# Patient Record
Sex: FEMALE | Race: WHITE | NOT HISPANIC OR LATINO | Employment: UNEMPLOYED | URBAN - METROPOLITAN AREA
[De-identification: names, ages, dates, MRNs, and addresses within clinical notes are randomized per-mention and may not be internally consistent; named-entity substitution may affect disease eponyms.]

---

## 2019-06-05 ENCOUNTER — OFFICE VISIT (OUTPATIENT)
Dept: OBSTETRICS AND GYNECOLOGY | Facility: CLINIC | Age: 39
End: 2019-06-05

## 2019-06-05 VITALS
BODY MASS INDEX: 20.02 KG/M2 | WEIGHT: 113 LBS | DIASTOLIC BLOOD PRESSURE: 70 MMHG | SYSTOLIC BLOOD PRESSURE: 100 MMHG | HEIGHT: 63 IN

## 2019-06-05 DIAGNOSIS — N76.0 ACUTE VAGINITIS: Primary | ICD-10-CM

## 2019-06-05 DIAGNOSIS — Z13.9 SCREENING FOR CONDITION: ICD-10-CM

## 2019-06-05 LAB
B-HCG UR QL: NEGATIVE
BILIRUB BLD-MCNC: NEGATIVE MG/DL
CLARITY, POC: CLEAR
COLOR UR: YELLOW
GLUCOSE UR STRIP-MCNC: NEGATIVE MG/DL
INTERNAL NEGATIVE CONTROL: NEGATIVE
INTERNAL POSITIVE CONTROL: POSITIVE
KETONES UR QL: NEGATIVE
LEUKOCYTE EST, POC: NEGATIVE
Lab: NORMAL
NITRITE UR-MCNC: NEGATIVE MG/ML
PH UR: 5 [PH] (ref 5–8)
PROT UR STRIP-MCNC: NEGATIVE MG/DL
RBC # UR STRIP: NEGATIVE /UL
SP GR UR: 1 (ref 1–1.03)
UROBILINOGEN UR QL: NORMAL

## 2019-06-05 PROCEDURE — 99213 OFFICE O/P EST LOW 20 MIN: CPT | Performed by: OBSTETRICS & GYNECOLOGY

## 2019-06-05 PROCEDURE — 81025 URINE PREGNANCY TEST: CPT | Performed by: OBSTETRICS & GYNECOLOGY

## 2019-06-05 RX ORDER — VALACYCLOVIR HYDROCHLORIDE 500 MG/1
500 TABLET, FILM COATED ORAL DAILY
Qty: 90 TABLET | Refills: 3 | Status: SHIPPED | OUTPATIENT
Start: 2019-06-05 | End: 2020-12-15

## 2019-06-05 RX ORDER — VALACYCLOVIR HYDROCHLORIDE 500 MG/1
500 TABLET, FILM COATED ORAL 2 TIMES DAILY
Qty: 6 TABLET | Refills: 6 | Status: SHIPPED | OUTPATIENT
Start: 2019-06-05 | End: 2019-06-08

## 2019-06-05 NOTE — PROGRESS NOTES
"      Jennie Curry is a 38 y.o. patient who presents for follow up of   Chief Complaint   Patient presents with   • Vaginal Discharge       HPI 38-year-old female complaining of acute vaginitis.  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  She recently had a second outbreak for which she took a refill of her Valtrex.  It was a 10-day prescription.  She feels fine on the medicine but as soon as she stops that she feels like things are just not right in the vagina.  She says her symptoms are intravaginal and not extra vaginal like on the vulva.  She has also taken 2 doses of Diflucan and is on probiotics orally.    The following portions of the patient's history were reviewed and updated as appropriate: allergies, current medications and problem list.    Review of Systems   Constitutional: Negative for appetite change, fever and unexpected weight change.   HENT: Negative for congestion and sore throat.    Respiratory: Negative for cough and shortness of breath.    Cardiovascular: Negative for chest pain and palpitations.   Gastrointestinal: Negative for abdominal distention, abdominal pain, constipation, diarrhea, nausea and vomiting.   Endocrine: Negative.    Genitourinary: Positive for genital sores, vaginal discharge and vaginal pain. Negative for dyspareunia, menstrual problem and pelvic pain.   Skin: Negative.    Neurological: Negative for dizziness and syncope.   Hematological: Negative.    Psychiatric/Behavioral: Negative for dysphoric mood and sleep disturbance. The patient is not nervous/anxious.        /70   Ht 160 cm (63\")   Wt 51.3 kg (113 lb)   LMP 05/27/2019   Breastfeeding? No   BMI 20.02 kg/m²     Physical Exam   Constitutional: She is oriented to person, place, and time. She appears well-developed and well-nourished.   HENT:   Head: Normocephalic and atraumatic.   Pulmonary/Chest: Effort normal. No respiratory distress.   Abdominal: Soft. She exhibits no distension and no " mass. There is no tenderness. There is no rebound and no guarding.   Genitourinary: There is no rash, tenderness or lesion on the right labia. There is no rash, tenderness or lesion on the left labia.   Musculoskeletal: Normal range of motion.   Neurological: She is alert and oriented to person, place, and time.   Skin: Skin is warm and dry.   Psychiatric: She has a normal mood and affect. Her behavior is normal. Judgment and thought content normal.   Nursing note and vitals reviewed.        Assessment/Plan    Jennie was seen today for vaginal discharge.    Diagnoses and all orders for this visit:    Acute vaginitis    Screening for condition  -     POC Urinalysis Dipstick  -     POC Pregnancy, Urine    Other orders  -     valACYclovir (VALTREX) 500 MG tablet; Take 1 tablet by mouth Daily for 90 days.  -     valACYclovir (VALTREX) 500 MG tablet; Take 1 tablet by mouth 2 (Two) Times a Day for 3 days.    Exam appears normal.  Recommend she stop probiotics.  We will start daily Valtrex HSV suppression and a new prescription for recurrent outbreaks was also sent in.  Treat accordingly.    Return if symptoms worsen or fail to improve.      J Carlos Resendiz MD  6/5/2019  4:04 PM

## 2019-06-08 LAB
A VAGINAE DNA VAG QL NAA+PROBE: NORMAL SCORE
BVAB2 DNA VAG QL NAA+PROBE: NORMAL SCORE
C ALBICANS DNA VAG QL NAA+PROBE: NEGATIVE
C GLABRATA DNA VAG QL NAA+PROBE: NEGATIVE
C TRACH RRNA SPEC QL NAA+PROBE: NEGATIVE
MEGA1 DNA VAG QL NAA+PROBE: NORMAL SCORE
N GONORRHOEA RRNA SPEC QL NAA+PROBE: NEGATIVE
T VAGINALIS RRNA SPEC QL NAA+PROBE: NEGATIVE

## 2020-12-15 RX ORDER — VALACYCLOVIR HYDROCHLORIDE 500 MG/1
TABLET, FILM COATED ORAL
Qty: 90 TABLET | Refills: 2 | Status: SHIPPED | OUTPATIENT
Start: 2020-12-15 | End: 2021-12-22

## 2021-12-22 RX ORDER — VALACYCLOVIR HYDROCHLORIDE 500 MG/1
TABLET, FILM COATED ORAL
Qty: 90 TABLET | Refills: 2 | Status: SHIPPED | OUTPATIENT
Start: 2021-12-22

## 2023-02-06 ENCOUNTER — OFFICE VISIT (OUTPATIENT)
Dept: OBSTETRICS AND GYNECOLOGY | Facility: CLINIC | Age: 43
End: 2023-02-06
Payer: COMMERCIAL

## 2023-02-06 VITALS
DIASTOLIC BLOOD PRESSURE: 82 MMHG | BODY MASS INDEX: 20.91 KG/M2 | HEIGHT: 63 IN | WEIGHT: 118 LBS | SYSTOLIC BLOOD PRESSURE: 118 MMHG

## 2023-02-06 DIAGNOSIS — Z01.419 ROUTINE GYNECOLOGICAL EXAMINATION: ICD-10-CM

## 2023-02-06 DIAGNOSIS — Z11.51 SPECIAL SCREENING EXAMINATION FOR HUMAN PAPILLOMAVIRUS (HPV): ICD-10-CM

## 2023-02-06 DIAGNOSIS — Z01.419 PAP SMEAR, LOW-RISK: Primary | ICD-10-CM

## 2023-02-06 LAB
BILIRUB BLD-MCNC: NEGATIVE MG/DL
CLARITY, POC: CLEAR
COLOR UR: YELLOW
GLUCOSE UR STRIP-MCNC: NEGATIVE MG/DL
KETONES UR QL: NEGATIVE
LEUKOCYTE EST, POC: NEGATIVE
NITRITE UR-MCNC: NEGATIVE MG/ML
PH UR: 5 [PH] (ref 5–8)
PROT UR STRIP-MCNC: NEGATIVE MG/DL
RBC # UR STRIP: NEGATIVE /UL
SP GR UR: 1 (ref 1–1.03)
UROBILINOGEN UR QL: NORMAL

## 2023-02-06 PROCEDURE — 81002 URINALYSIS NONAUTO W/O SCOPE: CPT | Performed by: NURSE PRACTITIONER

## 2023-02-06 PROCEDURE — 99386 PREV VISIT NEW AGE 40-64: CPT | Performed by: NURSE PRACTITIONER

## 2023-02-06 RX ORDER — PANTOPRAZOLE SODIUM 40 MG/1
40 TABLET, DELAYED RELEASE ORAL DAILY
COMMUNITY
Start: 2022-12-12

## 2023-02-06 RX ORDER — BROMPHENIRAMINE MALEATE, PSEUDOEPHEDRINE HYDROCHLORIDE, AND DEXTROMETHORPHAN HYDROBROMIDE 2; 30; 10 MG/5ML; MG/5ML; MG/5ML
SYRUP ORAL
COMMUNITY
Start: 2023-01-11

## 2023-02-06 RX ORDER — MESALAMINE 800 MG/1
1 TABLET, DELAYED RELEASE ORAL 3 TIMES DAILY
COMMUNITY
Start: 2022-12-07

## 2023-02-06 RX ORDER — AZITHROMYCIN 250 MG/1
TABLET, FILM COATED ORAL
COMMUNITY
Start: 2023-01-11

## 2023-02-06 RX ORDER — PREDNISONE 10 MG/1
TABLET ORAL
COMMUNITY
Start: 2022-12-29

## 2023-02-06 RX ORDER — MESALAMINE 1000 MG/1
SUPPOSITORY RECTAL
COMMUNITY
Start: 2022-12-12

## 2023-02-06 RX ORDER — ALUMINUM ZIRCONIUM OCTACHLOROHYDREX GLY 16 G/100G
1 GEL TOPICAL DAILY
COMMUNITY
Start: 2023-01-13

## 2023-02-10 ENCOUNTER — PATIENT ROUNDING (BHMG ONLY) (OUTPATIENT)
Dept: OBSTETRICS AND GYNECOLOGY | Facility: CLINIC | Age: 43
End: 2023-02-10
Payer: COMMERCIAL

## 2023-02-10 LAB
CYTOLOGIST CVX/VAG CYTO: NORMAL
CYTOLOGY CVX/VAG DOC CYTO: NORMAL
CYTOLOGY CVX/VAG DOC THIN PREP: NORMAL
DX ICD CODE: NORMAL
HIV 1 & 2 AB SER-IMP: NORMAL
HPV I/H RISK 4 DNA CVX QL PROBE+SIG AMP: NEGATIVE
OTHER STN SPEC: NORMAL
STAT OF ADQ CVX/VAG CYTO-IMP: NORMAL

## 2023-02-10 NOTE — PROGRESS NOTES
February 10, 2023    Hello, may I speak with Jennie Curry?    My name is jazmine rojas      I am  with KATHARINE GONG  [facility]  [address]  [place] [address] [phone].    Before we get started may I verify your date of birth? 1980    I am calling to officially welcome you to our practice and ask about your recent visit. Is this a good time to talk? no

## 2024-04-23 ENCOUNTER — OFFICE VISIT (OUTPATIENT)
Dept: ORTHOPEDIC SURGERY | Facility: CLINIC | Age: 44
End: 2024-04-23
Payer: COMMERCIAL

## 2024-04-23 VITALS
HEART RATE: 70 BPM | DIASTOLIC BLOOD PRESSURE: 74 MMHG | BODY MASS INDEX: 21.26 KG/M2 | HEIGHT: 63 IN | WEIGHT: 120 LBS | SYSTOLIC BLOOD PRESSURE: 120 MMHG

## 2024-04-23 DIAGNOSIS — M18.11 PRIMARY OSTEOARTHRITIS OF FIRST CARPOMETACARPAL JOINT OF RIGHT HAND: ICD-10-CM

## 2024-04-23 DIAGNOSIS — M79.641 RIGHT HAND PAIN: Primary | ICD-10-CM

## 2024-04-23 PROCEDURE — 73130 X-RAY EXAM OF HAND: CPT | Performed by: INTERNAL MEDICINE

## 2024-04-23 PROCEDURE — 20600 DRAIN/INJ JOINT/BURSA W/O US: CPT | Performed by: INTERNAL MEDICINE

## 2024-04-23 PROCEDURE — 99203 OFFICE O/P NEW LOW 30 MIN: CPT | Performed by: INTERNAL MEDICINE

## 2024-04-23 RX ORDER — FLUOXETINE HYDROCHLORIDE 20 MG/1
CAPSULE ORAL
COMMUNITY
Start: 2024-02-01

## 2024-04-23 RX ORDER — QUETIAPINE FUMARATE 25 MG/1
TABLET, FILM COATED ORAL
COMMUNITY
Start: 2024-02-01

## 2024-04-23 RX ORDER — TRIAMCINOLONE ACETONIDE 40 MG/ML
80 INJECTION, SUSPENSION INTRA-ARTICULAR; INTRAMUSCULAR
Status: COMPLETED | OUTPATIENT
Start: 2024-04-23 | End: 2024-04-23

## 2024-04-23 RX ORDER — LIDOCAINE HYDROCHLORIDE 10 MG/ML
1 INJECTION, SOLUTION EPIDURAL; INFILTRATION; INTRACAUDAL; PERINEURAL
Status: COMPLETED | OUTPATIENT
Start: 2024-04-23 | End: 2024-04-23

## 2024-04-23 RX ADMIN — TRIAMCINOLONE ACETONIDE 80 MG: 40 INJECTION, SUSPENSION INTRA-ARTICULAR; INTRAMUSCULAR at 13:30

## 2024-04-23 RX ADMIN — LIDOCAINE HYDROCHLORIDE 1 ML: 10 INJECTION, SOLUTION EPIDURAL; INFILTRATION; INTRACAUDAL; PERINEURAL at 13:30

## 2024-04-23 NOTE — PROGRESS NOTES
Subjective:     Patient ID: Jennie Curry is a 43 y.o. female.    Chief Complaint:    History of Present Illness  Jennie Curry presents to clinic today for evaluation of base of the right thumb pain.  She states that has been ongoing for about a year.  She states it is worse with activity and worse with gripping.  She states she has never had a specific injury.  She had an injection in her thumb years ago and it helped significantly.  She is hopeful to get some resolution of her symptoms and avoid surgery.  The patient is right-hand dominant.  She states she thinks that it is a repetitive use type injury from work.     Social History     Occupational History    Not on file   Tobacco Use    Smoking status: Former     Current packs/day: 0.00     Average packs/day: 0.5 packs/day for 20.0 years (10.0 ttl pk-yrs)     Types: Cigarettes     Start date: 2001     Quit date: 2021     Years since quittin.4     Passive exposure: Past    Smokeless tobacco: Never    Tobacco comments:     3 years smoke free   Vaping Use    Vaping status: Never Used   Substance and Sexual Activity    Alcohol use: Not Currently    Drug use: Not Currently    Sexual activity: Yes     Partners: Male     Birth control/protection: Tubal ligation     Comment: Tubal      Past Medical History:   Diagnosis Date    Anxiety     Arthritis of back 2017    Moderate    Arthritis of neck 2016    Moderate problems    CTS (carpal tunnel syndrome) 2016    Moderate problems    Herpes 2019    Ulcerative colitis      Past Surgical History:   Procedure Laterality Date     SECTION  2013     SECTION WITH TUBAL  2013    TUBAL ABDOMINAL LIGATION  2013    UMBILICAL HERNIA REPAIR  2015    WISDOM TOOTH EXTRACTION  2005       Family History   Problem Relation Age of Onset    Ovarian cancer Maternal Aunt     Breast cancer Neg Hx     Uterine cancer Neg Hx     Colon cancer Neg Hx                  Objective:  Vitals:    24 1334   BP:  "120/74   Pulse: 70   Weight: 54.4 kg (120 lb)   Height: 160 cm (63\")         04/23/24  1334   Weight: 54.4 kg (120 lb)     Body mass index is 21.26 kg/m².        Right Hand Exam     Tenderness   Right hand tenderness location: 1st cmc.    Range of Motion   Wrist   Extension:  normal   Flexion:  normal   Pronation:  normal   Supination:  normal     Muscle Strength   Wrist extension: 5/5   Wrist flexion: 5/5   : 5/5     Other   Erythema: absent  Scars: absent  Sensation: normal  Pulse: present    Comments:  Positive for CMC grind               Imaging: 3 views of the right hand were ordered and reviewed by myself in the office today  Indication: Right hand pain   Findings: x-rays demonstrate mild degenerative changes at the base of first CMC.  No other acute obvious abnormality  Comparative studies: None    Assessment:        1. Right hand pain    2. Primary osteoarthritis of first carpometacarpal joint of right hand           Plan:      Small Joint Arthrocentesis: R thumb CMC  Consent given by: patient  Site marked: site marked  Timeout: Immediately prior to procedure a time out was called to verify the correct patient, procedure, equipment, support staff and site/side marked as required   Supporting Documentation  Indications: pain   Procedure Details  Location: thumb - R thumb CMC  Preparation: Patient was prepped and draped in the usual sterile fashion  Needle size: 22 G  Approach: lateral  Medications administered: 1 mL lidocaine PF 1% 1 %; 80 mg triamcinolone acetonide 40 MG/ML  Patient tolerance: patient tolerated the procedure well with no immediate complications          Discussed treatment options at length with patient at today's visit.  I discussed with the patient that I believe she has developed first CMC mild osteoarthritis as a result of repetitive use. I discussed with the patient that the mainstays of conservative treatment for that include physical therapy, nonsteroidal anti-inflammatories, " injections, activity modification, and home exercises.  The patient states that they  would like to try first CMC corticosteroid injection and an over-the-counter first CMC brace.  At this point time the patient does not need to schedule follow-up with me today.  I am happy to see the patient back at any point time however if issues arise or they fail to make a full recovery.  Follow up: As needed with      Jennie Curry was in agreement with plan and had all questions answered.     Medications:  No orders of the defined types were placed in this encounter.      Followup:  No follow-ups on file.    Diagnoses and all orders for this visit:    1. Right hand pain (Primary)  -     XR Hand 3+ View Right    2. Primary osteoarthritis of first carpometacarpal joint of right hand    Other orders  -     Cancel: Small Joint Arthrocentesis  -     Cancel: Medium Joint Arthrocentesis  -     Small Joint Arthrocentesis: R thumb CMC          Dictated utilizing Dragon dictation

## 2024-04-26 ENCOUNTER — PATIENT ROUNDING (BHMG ONLY) (OUTPATIENT)
Dept: ORTHOPEDIC SURGERY | Facility: CLINIC | Age: 44
End: 2024-04-26
Payer: COMMERCIAL

## 2024-04-26 NOTE — PROGRESS NOTES
A card has been sent to the patient for PATIENT ROUNDING with [facility].

## 2024-11-14 NOTE — PROGRESS NOTES
Chief Complaint   Patient presents with    Ulcerative Colitis        Patient is a 44 y.o. who presents to the office as a new patient for management of ulcerative colitis after previously being followed by gastroenterologist in [facility].  Last outpatient visit with GI completed in 2024.  Patient has a significant past medical history of ulcerative proctosigmoiditis- diagnosed via colonoscopy on 2021 at outside health system.    Current UC regimen consist of Asacol 800 mg 1 tablet 3 times daily, Canasa 1000 mg suppository daily    Previous EGD and Colonoscopy evaluation: 2021 at time of diagnosis; pathology available however exam notes unavailable at time of today's visit    Past Surgical History:    Umbilical hernia repair    Social History:  Former tobacco user quit   Denies use of e-cigarettes  Denies current alcohol use    Family history:  Ovarian cancer in maternal aunt    History of Present Illness  The patient is a 44-year-old female presenting for evaluation of ulcerative colitis.    Ulcerative Colitis and Related Symptoms  She was initially diagnosed with proctitis following a colonoscopy in , although pathology was non-specific.  After endoscopic evaluation she completed stool testing for fecal calprotectin which were noted to be greater than 6000.      Monitoring gastroenterologist felt that the significant elevation in fecal calprotectin combined with endoscopic findings was consistent with ulcerative proctosigmoiditis, prompting the initiation of treatment.     She is currently prescribed Asacol (mesalamine) at a dosage of one tablet three times daily and Canasa (mesalamine) suppositories. The patient acknowledges inconsistent adherence to her medication regimen due to issues with prescription refills and shipment delays. She has been taking Asacol more regularly, at least one or two tablets daily.     Previously, she was on a prednisone taper starting at 40 mg,  decreasing by 10 mg per week, but found it ineffective. She has not trialed budesonide.    The patient reports episodes of constipation lasting 5 to 6 days, with a notable flare-up in August 2024, during which she experienced an absence of bowel movements for three days, followed by frequent bowel movements on the fourth day. She describes urgency during these episodes but denies nocturnal bowel movements.     In October 2024, she observed hematochezia, which has since improved. Despite dietary modifications, including a low lactose diet and avoidance of hydrogen sulfate-containing foods, she has not noted significant improvement.     She experiences excessive flatulence postprandially, leading to discomfort and pain, particularly during her 12-hour work shifts. She reports increased gastric acid production but does not use any acid-reducing medications. Postprandial pain localized to the lower left quadrant persists. Her previous physician recommended a gynecological evaluation for this pain.    Details of most recent flare described as:    - Onset: flare-up in August 2024; hematochezia observed in October 2024.    - Location: Lower left quadrant for postprandial pain.    - Duration: Episodes of constipation lasting 5 to 6 days; absence of bowel movements for three days followed by frequent bowel movements on the fourth day.    - Character: Constipation, urgency, hematochezia, excessive flatulence, increased gastric acid production, postprandial pain.    - Alleviating/Aggravating Factors: Dietary modifications (low lactose diet, avoidance of hydrogen sulfate-containing foods); inconsistent medication adherence due to prescription refill and shipment delays.    - Timing: Postprandial flatulence and pain, particularly during 12-hour work shifts.    - Severity: Discomfort and pain due to flatulence; persistent postprandial pain.    The patient denies unintentional weight loss, heartburn, nausea, vomiting, or  "dysphagia.    Anxiety Treatment  She was initiated on anxiety treatment approximately one year ago, and for several months thereafter, she reported significant improvement in her overall condition.     Result Review :    FECAL CALPROTECTIN (03/12/2024 16:23) - 27   FECAL CALPROTECTIN (03/18/2022 16:33)  6490   Tissue Pathology Exam (06/21/2021 07:00)   Transverse colon sampling:  Focal active colitis with mild melanosis coli  Rectal biopsy  Path: Acute colitis cryptitis, focal crypt abscess with retained architecture    reviewed and updated with patient  Outpatient Medications Marked as Taking for the 11/15/24 encounter (Office Visit) with Cheryl Luis APRN   Medication Sig Dispense Refill    FLUoxetine (PROzac) 20 MG capsule       Probiotic Product (Align) capsule Take 1 capsule by mouth Daily.      QUEtiapine (SEROquel) 25 MG tablet       valACYclovir (VALTREX) 500 MG tablet TAKE ONE TABLET BY MOUTH DAILY 90 tablet 2    [DISCONTINUED] mesalamine (ASACOL) 800 MG EC tablet Take 1 tablet by mouth 3 (Three) Times a Day.      [DISCONTINUED] mesalamine (CANASA) 1000 MG suppository INSERT ONE SUPPOSITORY RECTALLY EVERY NIGHT       Vital Signs:   /70 (BP Location: Left arm, Patient Position: Sitting, Cuff Size: Large Adult)   Ht 160 cm (62.99\")   Wt 55.8 kg (123 lb)   BMI 21.79 kg/m²     Body mass index is 21.79 kg/m².     Physical Exam  Vitals reviewed.   Constitutional:       General: She is not in acute distress.     Appearance: Normal appearance. She is not ill-appearing.   Eyes:      General: No scleral icterus.  Pulmonary:      Effort: Pulmonary effort is normal. No respiratory distress.   Abdominal:      General: Bowel sounds are normal. There is no distension.      Palpations: Abdomen is soft. Abdomen is not rigid. There is no mass or pulsatile mass.      Tenderness: There is abdominal tenderness. There is no guarding or rebound.      Hernia: No hernia is present.      Comments: Left lower quadrant " abdominal tenderness to deep palpation   Skin:     Coloration: Skin is not jaundiced.   Neurological:      Mental Status: She is alert and oriented to person, place, and time.   Psychiatric:         Thought Content: Thought content normal.         Judgment: Judgment normal.       Assessment and Plan    Diagnoses and all orders for this visit:    1. Ulcerative proctitis without complication (Primary)  -     CBC & Differential  -     Comprehensive Metabolic Panel  -     Vitamin B12  -     Vitamin D,25-Hydroxy  -     Folate  -     mesalamine (LIALDA) 1.2 g EC tablet; Take 3 tablets by mouth Daily for 90 days.  Dispense: 270 tablet; Refill: 0  -     Budesonide (ENTOCORT EC) 3 MG 24 hr capsule; Take 3 capsules by mouth Daily.  Dispense: 90 capsule; Refill: 2  -     Calprotectin, Fecal - Stool, Per Rectum  -     Gastrointestinal Panel, PCR - Stool, Per Rectum  -     Clostridioides difficile Toxin, PCR - Stool, Per Rectum  -     mesalamine (Canasa) 1000 MG suppository; Insert 1 suppository into the rectum 2 (Two) Times a Day.  Dispense: 60 suppository; Refill: 2       Assessment & Plan  1. Ulcerative colitis   Patient's condition is being managed with mesalamine, and fecal calprotectin level was 27 in 03/2024, indicating effective management.  Prescribe Lialda, 3 tablets to be taken in the morning in place of the Asacol in hopes of improving patient compliance and thus improving disease control.  Discontinue Asacol once Lialda prescription is filled  Prescribe Canasa suppositories for twice-daily use for 14 days, then reduce to once nightly  We will also start patient on budesonide 9 mg daily  Blood work to be completed today to assess CBC, CMP, vitamin B12, vitamin D, folate as patient reports generalized fatigue  Conduct stool test to assess current fecal calprotectin level; provide sample kit for return at convenience   Patient to provide an update in a month or sooner if condition does not improve; consider endoscopic  examination if condition worsens  We did also discuss proceeding with Coh[facility] IBD diagnostic testing to confirm UC diagnosis.  At this time patient would like to assess financial coverage by insurance before proceeding.  She will contact our office in the future if she would like to proceed.    To consider proceeding with colonoscopy evaluation pending above workup and clinical course.    Follow-up  - Return in 3 months for follow-up    Patient is agreeable to the outlined above treatment plan.  Verbalizes understanding and will contact office for any new or worsening concerns.  All questions answered and support provided.    Patient Instructions   Blood work today     Once we receive these results, our office will contact you to discuss updating your treatment plan as indicated      Stool Testing for Diarrhea:    We will check stool studies to assess for any infectious etiology that could be contributing to your symptoms.   Stool Specimens kit will be given today by the lab  If you cannot return stool specimens to lab within 24 hours, place specimen in provided bag with kit and keep in refrigerator until you are able to drop off specimen  Our office will contact you once we receive these results to discuss updating treatment plan as indicated   3.  I put in the orders for this, and you can go to the outpatient lab at any [facility] [place] facility to  the stool kit for this. [facility], [facility], or our office building are all OK. Any  time Monday-Friday from 8-4 is OK for the      For Ulcerative proctitis:  Continue Asacol as prescribed until you receive new prescription for lialda to take 3 tablets every AM   Also begin inserting canasa suppositories twice a day for 14 days, then can reduce to nightly  Begin taking budesonide 9 mg (3 capsules ) daily  Can be taken all at one time in the morning with food x 30 days         EMR Dragon/Transcription Disclaimer:  This document has been Dictated  utilizing Dragon dictation.     Patient or patient representative verbalized consent for the use of Ambient Listening during the visit with  GUADALUPE Aquino for chart documentation. 11/15/2024  09:56 EST    I spent more than 50 percent ( 31 minutes) of a  minute visit discussing diagnostic results, treatment options, and treatment plan.

## 2024-11-15 ENCOUNTER — OFFICE VISIT (OUTPATIENT)
Dept: GASTROENTEROLOGY | Facility: CLINIC | Age: 44
End: 2024-11-15
Payer: COMMERCIAL

## 2024-11-15 ENCOUNTER — LAB (OUTPATIENT)
Dept: LAB | Facility: HOSPITAL | Age: 44
End: 2024-11-15
Payer: COMMERCIAL

## 2024-11-15 VITALS
DIASTOLIC BLOOD PRESSURE: 70 MMHG | SYSTOLIC BLOOD PRESSURE: 110 MMHG | HEIGHT: 63 IN | WEIGHT: 123 LBS | BODY MASS INDEX: 21.79 KG/M2

## 2024-11-15 DIAGNOSIS — K51.20 ULCERATIVE PROCTITIS WITHOUT COMPLICATION: Primary | ICD-10-CM

## 2024-11-15 LAB
25(OH)D3 SERPL-MCNC: 33.7 NG/ML (ref 30–100)
ADV 40+41 DNA STL QL NAA+NON-PROBE: NOT DETECTED
ALBUMIN SERPL-MCNC: 3.8 G/DL (ref 3.5–5.2)
ALBUMIN/GLOB SERPL: 1.2 G/DL
ALP SERPL-CCNC: 83 U/L (ref 39–117)
ALT SERPL W P-5'-P-CCNC: 12 U/L (ref 1–33)
ANION GAP SERPL CALCULATED.3IONS-SCNC: 8.8 MMOL/L (ref 5–15)
AST SERPL-CCNC: 18 U/L (ref 1–32)
ASTRO TYP 1-8 RNA STL QL NAA+NON-PROBE: NOT DETECTED
BASOPHILS # BLD AUTO: 0.06 10*3/MM3 (ref 0–0.2)
BASOPHILS NFR BLD AUTO: 1 % (ref 0–1.5)
BILIRUB SERPL-MCNC: 0.3 MG/DL (ref 0–1.2)
BUN SERPL-MCNC: 11 MG/DL (ref 6–20)
BUN/CREAT SERPL: 14.5 (ref 7–25)
C CAYETANENSIS DNA STL QL NAA+NON-PROBE: NOT DETECTED
C COLI+JEJ+UPSA DNA STL QL NAA+NON-PROBE: NOT DETECTED
C DIFF TOX GENS STL QL NAA+PROBE: NEGATIVE
CALCIUM SPEC-SCNC: 9 MG/DL (ref 8.6–10.5)
CHLORIDE SERPL-SCNC: 105 MMOL/L (ref 98–107)
CO2 SERPL-SCNC: 24.2 MMOL/L (ref 22–29)
CREAT SERPL-MCNC: 0.76 MG/DL (ref 0.57–1)
CRYPTOSP DNA STL QL NAA+NON-PROBE: NOT DETECTED
DEPRECATED RDW RBC AUTO: 39.8 FL (ref 37–54)
E HISTOLYT DNA STL QL NAA+NON-PROBE: NOT DETECTED
EAEC PAA PLAS AGGR+AATA ST NAA+NON-PRB: NOT DETECTED
EC STX1+STX2 GENES STL QL NAA+NON-PROBE: NOT DETECTED
EGFRCR SERPLBLD CKD-EPI 2021: 100.5 ML/MIN/1.73
EOSINOPHIL # BLD AUTO: 0.21 10*3/MM3 (ref 0–0.4)
EOSINOPHIL NFR BLD AUTO: 3.4 % (ref 0.3–6.2)
EPEC EAE GENE STL QL NAA+NON-PROBE: NOT DETECTED
ERYTHROCYTE [DISTWIDTH] IN BLOOD BY AUTOMATED COUNT: 12.4 % (ref 12.3–15.4)
ETEC LTA+ST1A+ST1B TOX ST NAA+NON-PROBE: NOT DETECTED
FOLATE SERPL-MCNC: 7.95 NG/ML (ref 4.78–24.2)
G LAMBLIA DNA STL QL NAA+NON-PROBE: NOT DETECTED
GLOBULIN UR ELPH-MCNC: 3.3 GM/DL
GLUCOSE SERPL-MCNC: 85 MG/DL (ref 65–99)
HCT VFR BLD AUTO: 37.2 % (ref 34–46.6)
HGB BLD-MCNC: 11.8 G/DL (ref 12–15.9)
IMM GRANULOCYTES # BLD AUTO: 0.03 10*3/MM3 (ref 0–0.05)
IMM GRANULOCYTES NFR BLD AUTO: 0.5 % (ref 0–0.5)
LYMPHOCYTES # BLD AUTO: 2.03 10*3/MM3 (ref 0.7–3.1)
LYMPHOCYTES NFR BLD AUTO: 32.8 % (ref 19.6–45.3)
MCH RBC QN AUTO: 28 PG (ref 26.6–33)
MCHC RBC AUTO-ENTMCNC: 31.7 G/DL (ref 31.5–35.7)
MCV RBC AUTO: 88.2 FL (ref 79–97)
MONOCYTES # BLD AUTO: 0.51 10*3/MM3 (ref 0.1–0.9)
MONOCYTES NFR BLD AUTO: 8.2 % (ref 5–12)
NEUTROPHILS NFR BLD AUTO: 3.35 10*3/MM3 (ref 1.7–7)
NEUTROPHILS NFR BLD AUTO: 54.1 % (ref 42.7–76)
NOROVIRUS GI+II RNA STL QL NAA+NON-PROBE: NOT DETECTED
NRBC BLD AUTO-RTO: 0 /100 WBC (ref 0–0.2)
P SHIGELLOIDES DNA STL QL NAA+NON-PROBE: NOT DETECTED
PLATELET # BLD AUTO: 346 10*3/MM3 (ref 140–450)
PMV BLD AUTO: 10.7 FL (ref 6–12)
POTASSIUM SERPL-SCNC: 4.3 MMOL/L (ref 3.5–5.2)
PROT SERPL-MCNC: 7.1 G/DL (ref 6–8.5)
RBC # BLD AUTO: 4.22 10*6/MM3 (ref 3.77–5.28)
RVA RNA STL QL NAA+NON-PROBE: NOT DETECTED
S ENT+BONG DNA STL QL NAA+NON-PROBE: NOT DETECTED
SAPO I+II+IV+V RNA STL QL NAA+NON-PROBE: NOT DETECTED
SHIGELLA SP+EIEC IPAH ST NAA+NON-PROBE: NOT DETECTED
SODIUM SERPL-SCNC: 138 MMOL/L (ref 136–145)
V CHOL+PARA+VUL DNA STL QL NAA+NON-PROBE: NOT DETECTED
V CHOLERAE DNA STL QL NAA+NON-PROBE: NOT DETECTED
VIT B12 BLD-MCNC: 1000 PG/ML (ref 211–946)
WBC NRBC COR # BLD AUTO: 6.19 10*3/MM3 (ref 3.4–10.8)
Y ENTEROCOL DNA STL QL NAA+NON-PROBE: NOT DETECTED

## 2024-11-15 PROCEDURE — 87493 C DIFF AMPLIFIED PROBE: CPT

## 2024-11-15 PROCEDURE — 82306 VITAMIN D 25 HYDROXY: CPT

## 2024-11-15 PROCEDURE — 80053 COMPREHEN METABOLIC PANEL: CPT

## 2024-11-15 PROCEDURE — 87507 IADNA-DNA/RNA PROBE TQ 12-25: CPT

## 2024-11-15 PROCEDURE — 83993 ASSAY FOR CALPROTECTIN FECAL: CPT

## 2024-11-15 PROCEDURE — 82746 ASSAY OF FOLIC ACID SERUM: CPT

## 2024-11-15 PROCEDURE — 36415 COLL VENOUS BLD VENIPUNCTURE: CPT

## 2024-11-15 PROCEDURE — 82607 VITAMIN B-12: CPT

## 2024-11-15 PROCEDURE — 85025 COMPLETE CBC W/AUTO DIFF WBC: CPT

## 2024-11-15 RX ORDER — MESALAMINE 1.2 G/1
3.6 TABLET, DELAYED RELEASE ORAL DAILY
Qty: 270 TABLET | Refills: 0 | Status: SHIPPED | OUTPATIENT
Start: 2024-11-15 | End: 2025-02-13

## 2024-11-15 RX ORDER — BUDESONIDE 3 MG/1
9 CAPSULE, COATED PELLETS ORAL DAILY
Qty: 90 CAPSULE | Refills: 2 | Status: SHIPPED | OUTPATIENT
Start: 2024-11-15

## 2024-11-15 RX ORDER — MESALAMINE 1000 MG/1
1000 SUPPOSITORY RECTAL 2 TIMES DAILY
Qty: 60 SUPPOSITORY | Refills: 2 | Status: SHIPPED | OUTPATIENT
Start: 2024-11-15 | End: 2024-11-18 | Stop reason: SDUPTHER

## 2024-11-15 NOTE — PATIENT INSTRUCTIONS
Blood work today     Once we receive these results, our office will contact you to discuss updating your treatment plan as indicated      Stool Testing for Diarrhea:    We will check stool studies to assess for any infectious etiology that could be contributing to your symptoms.   Stool Specimens kit will be given today by the lab  If you cannot return stool specimens to lab within 24 hours, place specimen in provided bag with kit and keep in refrigerator until you are able to drop off specimen  Our office will contact you once we receive these results to discuss updating treatment plan as indicated   3.  I put in the orders for this, and you can go to the outpatient lab at any [facility] facility to  the stool kit for this. [facility], [facility], or our office building are all OK. Any  time Monday-Friday from 8-4 is OK for the      For Ulcerative proctitis:  Continue Asacol as prescribed until you receive new prescription for lialda to take 3 tablets every AM   Also begin inserting canasa suppositories twice a day for 14 days, then can reduce to nightly  Begin taking budesonide 9 mg (3 capsules ) daily  Can be taken all at one time in the morning with food x 30 days

## 2024-11-18 ENCOUNTER — TELEPHONE (OUTPATIENT)
Dept: GASTROENTEROLOGY | Facility: CLINIC | Age: 44
End: 2024-11-18
Payer: COMMERCIAL

## 2024-11-19 LAB — CALPROTECTIN STL-MCNT: 811 UG/G (ref 0–120)

## 2024-11-20 ENCOUNTER — PATIENT MESSAGE (OUTPATIENT)
Dept: GASTROENTEROLOGY | Facility: CLINIC | Age: 44
End: 2024-11-20
Payer: COMMERCIAL

## 2024-11-20 DIAGNOSIS — K51.20 ULCERATIVE PROCTITIS WITHOUT COMPLICATION: Primary | ICD-10-CM

## 2024-11-21 ENCOUNTER — PREP FOR SURGERY (OUTPATIENT)
Dept: SURGERY | Facility: SURGERY CENTER | Age: 44
End: 2024-11-21
Payer: COMMERCIAL

## 2024-11-21 DIAGNOSIS — R10.84 GENERALIZED ABDOMINAL PAIN: ICD-10-CM

## 2024-11-21 DIAGNOSIS — K51.20 ULCERATIVE PROCTITIS WITHOUT COMPLICATION: Primary | ICD-10-CM

## 2024-11-21 DIAGNOSIS — R19.5 ELEVATED FECAL CALPROTECTIN: ICD-10-CM

## 2024-11-21 RX ORDER — SODIUM CHLORIDE 0.9 % (FLUSH) 0.9 %
3 SYRINGE (ML) INJECTION EVERY 12 HOURS SCHEDULED
OUTPATIENT
Start: 2024-11-21

## 2024-11-21 RX ORDER — DICYCLOMINE HYDROCHLORIDE 10 MG/1
10 CAPSULE ORAL 3 TIMES DAILY PRN
Qty: 90 CAPSULE | Refills: 5 | Status: SHIPPED | OUTPATIENT
Start: 2024-11-21

## 2024-11-21 RX ORDER — SODIUM CHLORIDE 0.9 % (FLUSH) 0.9 %
10 SYRINGE (ML) INJECTION AS NEEDED
OUTPATIENT
Start: 2024-11-21

## 2024-11-21 RX ORDER — SODIUM CHLORIDE, SODIUM LACTATE, POTASSIUM CHLORIDE, CALCIUM CHLORIDE 600; 310; 30; 20 MG/100ML; MG/100ML; MG/100ML; MG/100ML
30 INJECTION, SOLUTION INTRAVENOUS CONTINUOUS PRN
OUTPATIENT
Start: 2024-11-21 | End: 2024-11-22

## 2024-11-21 RX ORDER — METHYLPREDNISOLONE 4 MG/1
TABLET ORAL
Qty: 21 TABLET | Refills: 0 | Status: SHIPPED | OUTPATIENT
Start: 2024-11-21

## 2024-12-18 ENCOUNTER — ANESTHESIA EVENT (OUTPATIENT)
Dept: PERIOP | Facility: HOSPITAL | Age: 44
End: 2024-12-18
Payer: COMMERCIAL

## 2024-12-19 ENCOUNTER — ANESTHESIA (OUTPATIENT)
Dept: PERIOP | Facility: HOSPITAL | Age: 44
End: 2024-12-19
Payer: COMMERCIAL

## 2024-12-19 ENCOUNTER — HOSPITAL ENCOUNTER (OUTPATIENT)
Facility: HOSPITAL | Age: 44
Setting detail: HOSPITAL OUTPATIENT SURGERY
Discharge: HOME | End: 2024-12-19
Attending: STUDENT IN AN ORGANIZED HEALTH CARE EDUCATION/TRAINING PROGRAM | Admitting: STUDENT IN AN ORGANIZED HEALTH CARE EDUCATION/TRAINING PROGRAM
Payer: COMMERCIAL

## 2024-12-19 VITALS
OXYGEN SATURATION: 100 % | TEMPERATURE: 97.7 F | BODY MASS INDEX: 22.15 KG/M2 | WEIGHT: 125 LBS | HEART RATE: 56 BPM | RESPIRATION RATE: 17 BRPM | HEIGHT: 63 IN | SYSTOLIC BLOOD PRESSURE: 112 MMHG | DIASTOLIC BLOOD PRESSURE: 82 MMHG

## 2024-12-19 DIAGNOSIS — K51.20 ULCERATIVE PROCTITIS WITHOUT COMPLICATION: ICD-10-CM

## 2024-12-19 DIAGNOSIS — R10.84 GENERALIZED ABDOMINAL PAIN: ICD-10-CM

## 2024-12-19 DIAGNOSIS — R19.5 ELEVATED FECAL CALPROTECTIN: ICD-10-CM

## 2024-12-19 PROCEDURE — 45378 DIAGNOSTIC COLONOSCOPY: CPT | Performed by: STUDENT IN AN ORGANIZED HEALTH CARE EDUCATION/TRAINING PROGRAM

## 2024-12-19 PROCEDURE — 25010000002 PROPOFOL 200 MG/20ML EMULSION

## 2024-12-19 PROCEDURE — 25010000002 LIDOCAINE PF 1% 1 % SOLUTION

## 2024-12-19 RX ORDER — SODIUM CHLORIDE 0.9 % (FLUSH) 0.9 %
10 SYRINGE (ML) INJECTION AS NEEDED
Status: DISCONTINUED | OUTPATIENT
Start: 2024-12-19 | End: 2024-12-19 | Stop reason: HOSPADM

## 2024-12-19 RX ORDER — SODIUM CHLORIDE 9 MG/ML
INJECTION, SOLUTION INTRAMUSCULAR; INTRAVENOUS; SUBCUTANEOUS AS NEEDED
Status: DISCONTINUED | OUTPATIENT
Start: 2024-12-19 | End: 2024-12-19 | Stop reason: SURG

## 2024-12-19 RX ORDER — PROPOFOL 10 MG/ML
INJECTION, EMULSION INTRAVENOUS AS NEEDED
Status: DISCONTINUED | OUTPATIENT
Start: 2024-12-19 | End: 2024-12-19 | Stop reason: SURG

## 2024-12-19 RX ORDER — SODIUM CHLORIDE 0.9 % (FLUSH) 0.9 %
3 SYRINGE (ML) INJECTION EVERY 12 HOURS SCHEDULED
Status: DISCONTINUED | OUTPATIENT
Start: 2024-12-19 | End: 2024-12-19 | Stop reason: HOSPADM

## 2024-12-19 RX ORDER — ONDANSETRON 2 MG/ML
4 INJECTION INTRAMUSCULAR; INTRAVENOUS ONCE AS NEEDED
Status: DISCONTINUED | OUTPATIENT
Start: 2024-12-19 | End: 2024-12-19 | Stop reason: HOSPADM

## 2024-12-19 RX ORDER — SODIUM CHLORIDE, SODIUM LACTATE, POTASSIUM CHLORIDE, CALCIUM CHLORIDE 600; 310; 30; 20 MG/100ML; MG/100ML; MG/100ML; MG/100ML
30 INJECTION, SOLUTION INTRAVENOUS CONTINUOUS PRN
Status: DISCONTINUED | OUTPATIENT
Start: 2024-12-19 | End: 2024-12-19 | Stop reason: HOSPADM

## 2024-12-19 RX ORDER — SODIUM CHLORIDE, SODIUM LACTATE, POTASSIUM CHLORIDE, CALCIUM CHLORIDE 600; 310; 30; 20 MG/100ML; MG/100ML; MG/100ML; MG/100ML
100 INJECTION, SOLUTION INTRAVENOUS ONCE
Status: DISCONTINUED | OUTPATIENT
Start: 2024-12-19 | End: 2024-12-19 | Stop reason: HOSPADM

## 2024-12-19 RX ORDER — LIDOCAINE HYDROCHLORIDE 10 MG/ML
0.5 INJECTION, SOLUTION EPIDURAL; INFILTRATION; INTRACAUDAL; PERINEURAL ONCE AS NEEDED
Status: DISCONTINUED | OUTPATIENT
Start: 2024-12-19 | End: 2024-12-19 | Stop reason: HOSPADM

## 2024-12-19 RX ORDER — SODIUM CHLORIDE 0.9 % (FLUSH) 0.9 %
10 SYRINGE (ML) INJECTION EVERY 12 HOURS SCHEDULED
Status: DISCONTINUED | OUTPATIENT
Start: 2024-12-19 | End: 2024-12-19 | Stop reason: HOSPADM

## 2024-12-19 RX ORDER — LIDOCAINE HYDROCHLORIDE 10 MG/ML
INJECTION, SOLUTION EPIDURAL; INFILTRATION; INTRACAUDAL; PERINEURAL AS NEEDED
Status: DISCONTINUED | OUTPATIENT
Start: 2024-12-19 | End: 2024-12-19 | Stop reason: SURG

## 2024-12-19 RX ADMIN — PROPOFOL 250 MG: 10 INJECTION, EMULSION INTRAVENOUS at 08:23

## 2024-12-19 RX ADMIN — SODIUM CHLORIDE 3 ML: 9 INJECTION, SOLUTION INTRAMUSCULAR; INTRAVENOUS; SUBCUTANEOUS at 08:38

## 2024-12-19 RX ADMIN — LIDOCAINE HYDROCHLORIDE 30 MG: 10 INJECTION, SOLUTION EPIDURAL; INFILTRATION; INTRACAUDAL; PERINEURAL at 08:20

## 2024-12-19 NOTE — ANESTHESIA POSTPROCEDURE EVALUATION
Patient: Jennie Curry    Procedure Summary       Date: 12/19/24 Room / Location: [facility] ENDOSCOPY 2 /  [facility]    Anesthesia Start: 0816 Anesthesia Stop: 0840    Procedure: COLONOSCOPY FOR SCREENING Diagnosis:       Ulcerative proctitis without complication      Elevated fecal calprotectin      Generalized abdominal pain      (Ulcerative proctitis without complication [K51.20])      (Elevated fecal calprotectin [R19.5])      (Generalized abdominal pain [R10.84])    Surgeons: Lorenzo Ozuna MD Provider: Mary Ching CRNA    Anesthesia Type: MAC ASA Status: 1            Anesthesia Type: MAC    Vitals  Vitals Value Taken Time   /76 12/19/24 0910   Temp 97.7 °F (36.5 °C) 12/19/24 0841   Pulse 56 12/19/24 0920   Resp     SpO2 100 % 12/19/24 0920   Vitals shown include unfiled device data.        Post Anesthesia Care and Evaluation    Patient location during evaluation: bedside  Patient participation: complete - patient participated  Level of consciousness: awake and alert  Pain score: 0  Pain management: adequate    Airway patency: patent  Anesthetic complications: No anesthetic complications  PONV Status: none  Cardiovascular status: acceptable  Respiratory status: acceptable  Hydration status: acceptable

## 2024-12-19 NOTE — ANESTHESIA PREPROCEDURE EVALUATION
Anesthesia Evaluation     Patient summary reviewed and Nursing notes reviewed   NPO Solid Status: > 8 hours  NPO Liquid Status: > 8 hours           Airway   Mallampati: I  TM distance: >3 FB  Neck ROM: full  No difficulty expected  Dental - normal exam     Pulmonary - normal exam    breath sounds clear to auscultation  (+) a smoker Former,  Cardiovascular - negative cardio ROS and normal exam  Exercise tolerance: good (4-7 METS)    Rhythm: regular  Rate: normal        Neuro/Psych  (+) numbness (right thumb), psychiatric history Anxiety  GI/Hepatic/Renal/Endo    (+) hiatal hernia (repaired in 2013)    Musculoskeletal     Abdominal    Substance History      OB/GYN          Other   arthritis (right thumb), autoimmune disease (UC) ,                       Anesthesia Plan    ASA 1     MAC     intravenous induction     Anesthetic plan, risks, benefits, and alternatives have been provided, discussed and informed consent has been obtained with: patient.  Pre-procedure education provided  Use of blood products discussed with patient  Consented to blood products.    Plan discussed with CRNA.        CODE STATUS:

## 2024-12-19 NOTE — H&P
Patient Care Team:  Catherine Brown PCP - General (Nurse Practitioner)    CHIEF COMPLAINT:  C/s for surveillance of Ulcerative Proctosigmoiditis.     HISTORY OF PRESENT ILLNESS:  C/s for surveillance of Ulcerative Proctosigmoiditis.     Past Medical History:   Diagnosis Date    Anxiety     Arthritis of back 2017    Moderate    Arthritis of neck 2016    Moderate problems    CTS (carpal tunnel syndrome) 2016    Moderate problems    Herpes 2019    Ulcerative colitis      Past Surgical History:   Procedure Laterality Date     SECTION       SECTION WITH TUBAL  2013    TUBAL ABDOMINAL LIGATION      UMBILICAL HERNIA REPAIR  2015    WISDOM TOOTH EXTRACTION  2005     Family History   Problem Relation Age of Onset    Ovarian cancer Maternal Aunt     Breast cancer Neg Hx     Uterine cancer Neg Hx     Colon cancer Neg Hx      Social History     Tobacco Use    Smoking status: Former     Current packs/day: 0.00     Average packs/day: 0.5 packs/day for 20.0 years (10.0 ttl pk-yrs)     Types: Cigarettes     Start date: 2001     Quit date: 2021     Years since quitting: 3.1     Passive exposure: Past    Smokeless tobacco: Never    Tobacco comments:     3 years smoke free   Vaping Use    Vaping status: Never Used   Substance Use Topics    Alcohol use: Not Currently    Drug use: Not Currently     Medications Prior to Admission   Medication Sig Dispense Refill Last Dose/Taking    dicyclomine (BENTYL) 10 MG capsule Take 1 capsule by mouth 3 (Three) Times a Day As Needed (abdominal pain/diarrhea). 90 capsule 5 2024 Bedtime    FLUoxetine (PROzac) 20 MG capsule    2024    mesalamine (Canasa) 1000 MG suppository Insert 1 suppository into the rectum Every Night. 90 suppository 3 2024 Bedtime    mesalamine (LIALDA) 1.2 g EC tablet Take 3 tablets by mouth Daily for 90 days. 270 tablet 0 2024 Bedtime    Probiotic Product (Align) capsule Take 1 capsule by mouth Daily.   2024  "   QUEtiapine (SEROquel) 25 MG tablet    12/18/2024 Bedtime    Budesonide (ENTOCORT EC) 3 MG 24 hr capsule Take 3 capsules by mouth Daily. 90 capsule 2      Allergies:  Penicillins    REVIEW OF SYSTEMS:  Please see the above history of present illness for pertinent positives and negatives.  The remainder of the patient's systems have been reviewed and are negative.     Vital Signs  Temp:  [98 °F (36.7 °C)] 98 °F (36.7 °C)  Heart Rate:  [64] 64  Resp:  [17] 17  BP: (117)/(76) 117/76    Flowsheet Rows      Flowsheet Row First Filed Value   Admission Height 160 cm (63\") Documented at 12/18/2024 1317   Admission Weight 56.7 kg (125 lb) Documented at 12/18/2024 1317             Physical Exam:  Physical Exam   Constitutional: Patient appears well-developed and well-nourished and in no acute distress   HEENT:   Head: Normocephalic and atraumatic.   Eyes:  Pupils are equal, round, and reactive to light. EOM are intact. Sclerae are anicteric and non-injected.  Mouth and Throat: Patient has moist mucous membranes. Oropharynx is clear of any erythema or exudate.     Neck: Neck supple. No JVD present. No thyromegaly present. No lymphadenopathy present.  Cardiovascular: Regular rate, regular rhythm, S1 normal and S2 normal.  Exam reveals no gallop and no friction rub.  No murmur heard.  Pulmonary/Chest: Lungs are clear to auscultation bilaterally. No respiratory distress. No wheezes. No rhonchi. No rales.   Abdominal: Soft. Bowel sounds are normal. No distension and no mass. There is no hepatosplenomegaly. There is no tenderness.   Musculoskeletal: Normal Muscle tone  Extremities: No edema. Pulses are palpable in all 4 extremities.  Neurological: Patient is alert and oriented to person, place, and time. Cranial nerves II-XII are grossly intact with no focal deficits.  Skin: Skin is warm. No rash noted. Nails show no clubbing.  No cyanosis or erythema.    Debilities/Disabilities Identified: None  Emotional Behavior: " Appropriate     Results Review:   I reviewed the patient's new clinical results.    Lab Results (most recent)       None            Imaging Results (Most Recent)       None          reviewed    ECG/EMG Results (most recent)       None          reviewed    Assessment & Plan   C/s for surveillance of Ulcerative Proctosigmoiditis.  /  colonoscopy      I discussed the patient's findings and my recommendations with patient.     Lorenzo Ozuna MD  12/19/24  08:22 EST    Time: 10 min prior to procedure.

## 2024-12-23 ENCOUNTER — OFFICE VISIT (OUTPATIENT)
Age: 44
End: 2024-12-23
Payer: COMMERCIAL

## 2024-12-23 DIAGNOSIS — F40.10 SOCIAL ANXIETY DISORDER: Primary | ICD-10-CM

## 2024-12-23 PROCEDURE — 90792 PSYCH DIAG EVAL W/MED SRVCS: CPT

## 2024-12-23 RX ORDER — QUETIAPINE FUMARATE 25 MG/1
25 TABLET, FILM COATED ORAL NIGHTLY
Qty: 30 TABLET | Refills: 0 | Status: SHIPPED | OUTPATIENT
Start: 2024-12-23

## 2024-12-23 RX ORDER — FLUOXETINE 40 MG/1
40 CAPSULE ORAL DAILY
Qty: 30 CAPSULE | Refills: 1 | Status: SHIPPED | OUTPATIENT
Start: 2024-12-23 | End: 2025-02-21
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
## 2024-12-27 ENCOUNTER — TELEPHONE (OUTPATIENT)
Age: 44
End: 2024-12-27
Payer: COMMERCIAL

## 2024-12-27 ENCOUNTER — PATIENT ROUNDING (BHMG ONLY) (OUTPATIENT)
Age: 44
End: 2024-12-27
Payer: COMMERCIAL

## 2024-12-27 NOTE — TELEPHONE ENCOUNTER
Pt sent a message through my chart stating that she was afraid to come off her Seroquel as she has had difficulties coming off of it in the past in regards to sleep.  Discussed with patient that the plan is to continue her current dose of Seroquel until her anxiety is under better control with the increased dose of Prozac.  Discussed with patient that we will talk about taking her off Seroquel in the future.

## 2025-01-17 ENCOUNTER — TRANSCRIBE ORDERS (OUTPATIENT)
Dept: ADMINISTRATIVE | Facility: HOSPITAL | Age: 45
End: 2025-01-17
Payer: COMMERCIAL

## 2025-01-17 DIAGNOSIS — Z12.31 BREAST CANCER SCREENING BY MAMMOGRAM: Primary | ICD-10-CM

## 2025-01-20 ENCOUNTER — OFFICE VISIT (OUTPATIENT)
Age: 45
End: 2025-01-20
Payer: COMMERCIAL

## 2025-01-20 VITALS
HEIGHT: 63 IN | HEART RATE: 80 BPM | WEIGHT: 120 LBS | BODY MASS INDEX: 21.26 KG/M2 | DIASTOLIC BLOOD PRESSURE: 83 MMHG | SYSTOLIC BLOOD PRESSURE: 124 MMHG | OXYGEN SATURATION: 96 %

## 2025-01-20 VITALS — HEART RATE: 70 BPM | OXYGEN SATURATION: 99 % | SYSTOLIC BLOOD PRESSURE: 135 MMHG | DIASTOLIC BLOOD PRESSURE: 86 MMHG

## 2025-01-20 DIAGNOSIS — F40.10 SOCIAL ANXIETY DISORDER: Primary | ICD-10-CM

## 2025-01-20 PROCEDURE — 96127 BRIEF EMOTIONAL/BEHAV ASSMT: CPT

## 2025-01-20 PROCEDURE — 99214 OFFICE O/P EST MOD 30 MIN: CPT

## 2025-01-20 RX ORDER — HYDROXYZINE HYDROCHLORIDE 25 MG/1
25 TABLET, FILM COATED ORAL 3 TIMES DAILY PRN
Qty: 90 TABLET | Refills: 0 | Status: SHIPPED | OUTPATIENT
Start: 2025-01-20

## 2025-01-20 RX ORDER — VENLAFAXINE HYDROCHLORIDE 37.5 MG/1
37.5 CAPSULE, EXTENDED RELEASE ORAL DAILY
Qty: 30 CAPSULE | Refills: 1 | Status: SHIPPED | OUTPATIENT
Start: 2025-01-20 | End: 2025-03-21

## 2025-01-20 NOTE — PROGRESS NOTES
"     Office  Follow Up Visit      Patient Name: Jennie Curry  : 1980   MRN: [id number]     Referring Provider: Catherine Brown    Chief Complaint:  \"anxiety\"     ICD-10-CM ICD-9-CM   1. Social anxiety disorder  F40.10 300.23      History of Present Illness:   Jennie Curry is a 44 y.o. female who is here today for follow up. Pt has a history of ASHIA with panic attacks and social anxiety DO. Pt was seen for an initial evaluation on 24. Pt came to provider taking Prozac 20 mg daily and Seroquel 25 mg nightly. Pt felt that this medication kept her anxiety manageable but did not like the daytime sedation she would experience with seroquel but does not feel like her anxiety is managed with prozac 20mg daily alone. During initial evaluation pt's prozac was increased to 40mg daily and seroquel 25mg nightly was continue for the time being.     Today pt reports that she only took the prozac 40mg daily for a few days but then remembered that she experienced sexual side effects when her prozac was increased last time. Pt's anxiety and depressive symptoms remain about the same as last visit. She feels anxious and depressed at times but these feelings are usually manageable. Admits that she did stop taking her seroquel and that she has seen a slight increase in her anxiety and her sleep routine is a little different without the medication. States she wakes up every 4 hours now but still gets about 5 hours of sleep each night. Denies SI/HI/AVH. No change in appetite.     Subjective      Review of Systems:   Review of Systems   Constitutional:  Negative for appetite change.   Respiratory:  Negative for chest tightness and shortness of breath.    Gastrointestinal:  Negative for abdominal pain, constipation, diarrhea, nausea and vomiting.   Genitourinary:  Negative for difficulty urinating.   Neurological:  Negative for headaches.   Psychiatric/Behavioral:  Negative for hallucinations and suicidal ideas. The patient " is nervous/anxious.        PHQ-9 Depression Screening  Little interest or pleasure in doing things? Several days   Feeling down, depressed, or hopeless? Several days   PHQ-2 Total Score 2   Trouble falling or staying asleep, or sleeping too much? Almost all   Feeling tired or having little energy? Over half   Poor appetite or overeating? Almost all   Feeling bad about yourself - or that you are a failure or have let yourself or your family down? Not at all   Trouble concentrating on things, such as reading the newspaper or watching television? Over half   Moving or speaking so slowly that other people could have noticed? Or the opposite - being so fidgety or restless that you have been moving around a lot more than usual? Not at all   Thoughts that you would be better off dead, or of hurting yourself in some way? Not at all   PHQ-9 Total Score 12   If you checked off any problems, how difficult have these problems made it for you to do your work, take care of things at home, or get along with other people? Somewhat difficult     ASHIA-7      Over the last two weeks, how often have you been bothered by the following problems?  Feeling nervous, anxious or on edge: Several days  Not being able to stop or control worrying: More than half the days  Worrying too much about different things: Nearly every day  Trouble Relaxing: More than half the days  Being so restless that it is hard to sit still: Not at all  Becoming easily annoyed or irritable: Several days  Feeling afraid as if something awful might happen: Not at all  ASHIA 7 Total Score: 9  If you checked any problems, how difficult have these problems made it for you to do your work, take care of things at home, or get along with other people: Somewhat difficult    Patient History:   The following portions of the patient's history were reviewed and updated as appropriate: allergies, current medications, past family history, past medical history, past social history,  past surgical history and problem list.     Social History     Socioeconomic History    Marital status: Single   Tobacco Use    Smoking status: Former     Current packs/day: 0.00     Average packs/day: 0.5 packs/day for 20.0 years (10.0 ttl pk-yrs)     Types: Cigarettes     Start date: 11/9/2001     Quit date: 11/9/2021     Years since quitting: 3.2     Passive exposure: Past    Smokeless tobacco: Never    Tobacco comments:     3 years smoke free   Vaping Use    Vaping status: Never Used   Substance and Sexual Activity    Alcohol use: Not Currently    Drug use: Not Currently    Sexual activity: Yes     Partners: Male     Birth control/protection: Tubal ligation     Comment: Tubal       Medications:     Current Outpatient Medications:     Budesonide (ENTOCORT EC) 3 MG 24 hr capsule, Take 3 capsules by mouth Daily., Disp: 90 capsule, Rfl: 2    dicyclomine (BENTYL) 10 MG capsule, Take 1 capsule by mouth 3 (Three) Times a Day As Needed (abdominal pain/diarrhea)., Disp: 90 capsule, Rfl: 5    FLUoxetine (PROzac) 40 MG capsule, Take 1 capsule by mouth Daily for 60 days., Disp: 30 capsule, Rfl: 1    hydrOXYzine (ATARAX) 25 MG tablet, Take 1 tablet by mouth 3 (Three) Times a Day As Needed for Anxiety., Disp: 90 tablet, Rfl: 0    mesalamine (Canasa) 1000 MG suppository, Insert 1 suppository into the rectum Every Night., Disp: 90 suppository, Rfl: 3    mesalamine (LIALDA) 1.2 g EC tablet, Take 3 tablets by mouth Daily for 90 days., Disp: 270 tablet, Rfl: 0    Probiotic Product (Align) capsule, Take 1 capsule by mouth Daily., Disp: , Rfl:     QUEtiapine (SEROquel) 25 MG tablet, Take 1 tablet by mouth Every Night., Disp: 30 tablet, Rfl: 0    venlafaxine XR (Effexor XR) 37.5 MG 24 hr capsule, Take 1 capsule by mouth Daily for 60 days. Day 1-3: Take Prozac 20mg daily and Effexor XR 37.5mg daily. Day 4 : Stop Prozac and continue Effexor 37.5mg daily until next appointment., Disp: 30 capsule, Rfl: 1    Objective     Physical  "Exam:  Vital Signs:   Vitals:    01/20/25 0815   BP: 135/86   Pulse: 70   SpO2: 99%     There is no height or weight on file to calculate BMI.     Mental Status Exam:   MENTAL STATUS EXAM   General Appearance:  Cleanly groomed and dressed  Eye Contact:  Good eye contact  Attitude:  Cooperative  Motor Activity:  Normal gait, posture  Muscle Strength:  Normal  Speech:  Normal rate, tone, volume  Language:  Spontaneous  Mood and affect:  Anxious  Hopelessness:  Denies  Loneliness: Denies  Thought Process:  Logical  Associations/ Thought Content:  No delusions  Hallucinations:  None  Suicidal Ideations:  Not present  Homicidal Ideation:  Not present  Sensorium:  Alert and clear  Orientation:  Person, place, time and situation  Attention Span/ Concentration:  Good  Fund of Knowledge:  Appropriate for age and educational level  Intellectual Functioning:  Average range  Insight:  Good  Judgement:  Good  Reliability:  Good  Impulse Control:  Good       @RESULASTCBCDIFFPANEL,TSH,LABLIPI,VKZZOAXD29,MYHHZDWT41,MG,FOLATE,PROLACTIN,CRPRESULT,CMP,L3AZCWOTBDE)@    Lab Results   Component Value Date    GLUCOSE 85 11/15/2024    BUN 11 11/15/2024    CREATININE 0.76 11/15/2024    EGFR 100.5 11/15/2024    BCR 14.5 11/15/2024    K 4.3 11/15/2024    CO2 24.2 11/15/2024    CALCIUM 9.0 11/15/2024    ALBUMIN 3.8 11/15/2024    BILITOT 0.3 11/15/2024    AST 18 11/15/2024    ALT 12 11/15/2024       Lab Results   Component Value Date    WBC 6.19 11/15/2024    HGB 11.8 (L) 11/15/2024    HCT 37.2 11/15/2024    MCV 88.2 11/15/2024     11/15/2024       No results found for: \"CHOL\", \"CHLPL\", \"TRIG\", \"HDL\", \"LDL\"             Assessment / Plan      Assessment:  Pt is a 45 yo female with a history of ASHIA with panic attacks and social anxiety DO. Pt was seen for an initial evaluation on 12/23/24. Pt came to provider taking Prozac 20 mg daily and Seroquel 25 mg nightly. Pt felt that this medication kept her anxiety manageable but did not like the " daytime sedation she would experience with seroquel but does not feel like her anxiety is managed with prozac 20mg daily alone. During initial evaluation pt's prozac was increased to 40mg daily and seroquel 25mg nightly was continue for the time being. Today pt reports that she only took the prozac 40mg daily for a few days but then remembered that she experienced sexual side effects when her prozac was increased last time. Admits that she stopped taking her seroquel like 7 days ago and has notice an increase in her anxiety and some sleep disturbance. Pt's PHQ9 and GAD7 scores are higher than last visit. Pt has had a good response to Effexor XR in the past, so we will cross taper pt from prozac to Effexor XR. Taper schedule below. Pt starts therapy in clinic soon. Will see pt in 4 weeks.     Taper schedule: Day 1-3: Prozac 20mg daily and Effexor XR 37.5mg daily. Day 4 : Stop Prozac and continue Effexor 37.5mg daily until next appointment.       Diagnoses and all orders for this visit:    1. Social anxiety disorder (Primary)    Other orders  -     venlafaxine XR (Effexor XR) 37.5 MG 24 hr capsule; Take 1 capsule by mouth Daily for 60 days. Day 1-3: Take Prozac 20mg daily and Effexor XR 37.5mg daily. Day 4 : Stop Prozac and continue Effexor 37.5mg daily until next appointment.  Dispense: 30 capsule; Refill: 1  -     hydrOXYzine (ATARAX) 25 MG tablet; Take 1 tablet by mouth 3 (Three) Times a Day As Needed for Anxiety.  Dispense: 90 tablet; Refill: 0       Plan/Pt education:   - Discussed medication options and treatment plan of prescribed medication as well as the risks, benefits, and side effects   - Encouraged pt to continue supportive psychotherapy efforts and medications as indicated.  - Educated pt on signs and symptoms of serotonin syndrome/neuroleptic malignant syndrome and notified pt to go to the ER if experiencing these symptoms.   - Notified pt that antidepressants can sometimes cause worsening SI and to  monitor for this.     Short-term goals: Continue to develop rapport with patient.     Long-term goals: Symptom reduction with medication and therapy.     Weakness: Currently not in therapy.     Strengths: Great support from .    Continue supportive psychotherapy efforts and medications as indicated. Treatment and medication options discussed during today's visit. Patient ackowledged and verbally consented to continue with current treatment plan and was educated on the importance of compliance with treatment and follow-up appointments. Patient seems reasonably able to adhere to treatment plan.      Medication Considerations:  Discussed medication options and treatment plan of prescribed medication(s) as well as the risks, benefits, and potential side effects. Patient is agreeable to call the office with any worsening of symptoms or onset of side effects. Patient is agreeable to call 911 or go to the nearest ER should he/she begin having SI/HI.    Quality Measures:   Patient does not use tobacco products.     Dorian reviewed and is appropriate.    Follow Up:   Return in about 4 weeks (around 2/17/2025) for Recheck.    Copied text in this note has been reviewed and is accurate as of 1/20/2025.    Part of this note may be an electronic transcription/translation of spoken language to printed text using the Dragon Dictation System.    GUADALUPE Cedillo, PMHNP-BC

## 2025-01-28 ENCOUNTER — TELEPHONE (OUTPATIENT)
Dept: ORTHOPEDIC SURGERY | Facility: CLINIC | Age: 45
End: 2025-01-28
Payer: COMMERCIAL

## 2025-01-28 NOTE — TELEPHONE ENCOUNTER
CALLED PATIENT, LEFT VOICEMAIL,   PATIENT SENT A MESSAGE VIA [employer],   With Provider: RIANNA SHARIF [[facility] [place] [facility]]  Preferred Date Range: 1/27/2025 - 2/1/2025  Preferred Times: Any Time  Reason for Visit: Follow-up  Comments: Worsening pain in left thumb. Need appointment for cortisone injection or other relief.     IF PATIENT CALLS BACK, ANYONE CAN SCHEDULE THIS APPOINTMENT.

## 2025-02-05 ENCOUNTER — TELEPHONE (OUTPATIENT)
Age: 45
End: 2025-02-05

## 2025-02-05 NOTE — TELEPHONE ENCOUNTER
Patient called to let provider know that the hydroxyzine is putting her to sleep and she cannot function. She even tried taking a half dose. She also stated that she feels like the her other medications are not working and she is not sure if it is because of the anxiety medication. She would like a return call.

## 2025-02-06 RX ORDER — VENLAFAXINE HYDROCHLORIDE 75 MG/1
75 CAPSULE, EXTENDED RELEASE ORAL DAILY
Qty: 30 CAPSULE | Refills: 1 | Status: SHIPPED | OUTPATIENT
Start: 2025-02-06 | End: 2025-04-07

## 2025-02-06 NOTE — TELEPHONE ENCOUNTER
Called and spoke with pt regarding the message that was sent to me yesterday.  Will increase patient's Effexor XR to 75 mg daily to improve patient's anxiety.  Discussed with patient that hydroxyzine is sedating and to use it whenever she feels her anxiety is heightened.  Discussed with patient that it may not be a medication that you would want to use during your workday because it is so sedating.  Hopefully patient will see improvement with increasing her Effexor.  Patient denies SI/HI.

## 2025-02-13 DIAGNOSIS — K51.20 ULCERATIVE PROCTITIS WITHOUT COMPLICATION: ICD-10-CM

## 2025-02-14 RX ORDER — MESALAMINE 1.2 G/1
3.6 TABLET, DELAYED RELEASE ORAL DAILY
Qty: 270 TABLET | Refills: 0 | Status: SHIPPED | OUTPATIENT
Start: 2025-02-14

## 2025-03-21 ENCOUNTER — HOSPITAL ENCOUNTER (OUTPATIENT)
Dept: MAMMOGRAPHY | Facility: HOSPITAL | Age: 45
Discharge: HOME | End: 2025-03-21
Admitting: NURSE PRACTITIONER
Payer: COMMERCIAL

## 2025-03-21 DIAGNOSIS — Z12.31 BREAST CANCER SCREENING BY MAMMOGRAM: ICD-10-CM

## 2025-03-21 PROCEDURE — 77063 BREAST TOMOSYNTHESIS BI: CPT | Performed by: RADIOLOGY

## 2025-03-21 PROCEDURE — 77063 BREAST TOMOSYNTHESIS BI: CPT

## 2025-03-21 PROCEDURE — 77067 SCR MAMMO BI INCL CAD: CPT | Performed by: RADIOLOGY

## 2025-03-21 PROCEDURE — 77067 SCR MAMMO BI INCL CAD: CPT

## 2025-04-15 DIAGNOSIS — K51.20 ULCERATIVE PROCTITIS WITHOUT COMPLICATION: ICD-10-CM

## 2025-04-15 RX ORDER — BUDESONIDE 3 MG/1
9 CAPSULE, COATED PELLETS ORAL DAILY
Qty: 90 CAPSULE | Refills: 2 | OUTPATIENT
Start: 2025-04-15

## 2025-04-24 ENCOUNTER — OFFICE VISIT (OUTPATIENT)
Dept: GASTROENTEROLOGY | Facility: CLINIC | Age: 45
End: 2025-04-24
Payer: COMMERCIAL

## 2025-04-24 VITALS
SYSTOLIC BLOOD PRESSURE: 108 MMHG | WEIGHT: 123.2 LBS | DIASTOLIC BLOOD PRESSURE: 76 MMHG | BODY MASS INDEX: 21.83 KG/M2 | HEIGHT: 63 IN

## 2025-04-24 DIAGNOSIS — K51.20 ULCERATIVE PROCTITIS WITHOUT COMPLICATION: Primary | ICD-10-CM

## 2025-04-24 DIAGNOSIS — R10.30 LOWER ABDOMINAL PAIN: ICD-10-CM

## 2025-04-24 DIAGNOSIS — K58.0 IRRITABLE BOWEL SYNDROME WITH DIARRHEA: ICD-10-CM

## 2025-04-24 RX ORDER — LAMOTRIGINE 25 MG/1
25 TABLET, EXTENDED RELEASE ORAL 3 TIMES DAILY
COMMUNITY
Start: 2025-04-14

## 2025-04-24 RX ORDER — BUSPIRONE HYDROCHLORIDE 10 MG/1
10 TABLET ORAL DAILY
COMMUNITY
Start: 2025-04-14

## 2025-04-24 NOTE — PROGRESS NOTES
Patient or patient representative verbalized consent for the use of Ambient Listening during the visit with  GUADALUPE Aquino for chart documentation. 2025  10:39 EDT    Chief Complaint   Patient presents with    Ulcerative proctitis without complication    Abdominal Pain    Diarrhea    Nausea         Patient is a 44 y.o. who presents to the office for follow-up after undergoing colonoscopy evaluation.  Last in office visit completed on 2024 as a new patient to the practice.  Patient has a significant past medical history of ulcerative proctosigmoiditis- diagnosed via colonoscopy on 2021 at outside health system.    2024 Colonoscopy:  Bowel prep described as fair  Successful complete evaluation and overall unremarkable examined portion of colon without active disease  No specimens obtained  External hemorrhoids  Next Colonoscopy for screening recommended at  7-10 -year interval due  2031   Colonoscopy (2024 08:14)       Past Surgical History:    Umbilical hernia repair     Social History:  Former tobacco user quit   Denies use of e-cigarettes  Denies current alcohol use     Family history:  Ovarian cancer in maternal aunt    History of Present Illness  The patient presents for evaluation of flatulence and abdominal distension.    The patient's condition is generally stable, with significant fluctuations occurring within a week or a few days depending on exposure to various factors. Medication is believed to have helped.  However, episodes of severe flatulence and abdominal distension, which cannot be relieved, occur at least 2 to 3 days per week without known exacerbating triggers. These episodes are accompanied by severe abdominal cramps, akin to dysmenorrhea, causing significant discomfort.     Bowel movements are regular, although 1 or 2 days per week may pass without defecation. No nocturnal bowel movements are reported. Bowel movements are slow, often requiring  three trips to the bathroom to complete.    The patient continues to use suppositories and is considering initiating dicyclomine for spasms and cramping. They have been on budesonide for 2 months, which is believed to have been beneficial. They also take Lialda and mesalamine, which are found to be helpful. No fiber supplements are currently taken, but anti-inflammatory foods are being incorporated into the diet.    Currently on lorazepam, they are switching to a less sedating medication due to concerns about stress tolerance. Diagnosed with borderline personality disorder, they are considering Dialectical Behavior Therapy (DBT). A correlation between stressful events and gastrointestinal symptoms has been noticed.    No unintentional weight loss    SOCIAL HISTORY  - Occupation: Works on a line  - Diet: Consuming anti-inflammatory foods        Current/Previous treatment for ulcerative proctosigmoiditis  Current:  Current UC regimen consist of Asacol 800 mg 1 tablet 3 times daily, Canasa 1000 mg suppository daily  Previous:  Budesonide 9 mg- completed January 2025       Medications    Current Outpatient Medications:     busPIRone (BUSPAR) 10 MG tablet, Take 1 tablet by mouth Daily., Disp: , Rfl:     dicyclomine (BENTYL) 10 MG capsule, Take 1 capsule by mouth 3 (Three) Times a Day As Needed (abdominal pain/diarrhea)., Disp: 90 capsule, Rfl: 5    lamoTRIgine ER 25 MG tablet sustained-release 24 hour, 25 mg 3 times a day., Disp: , Rfl:     mesalamine (Canasa) 1000 MG suppository, Insert 1 suppository into the rectum Every Night., Disp: 90 suppository, Rfl: 3    mesalamine (LIALDA) 1.2 g EC tablet, TAKE THREE TABLETS BY MOUTH DAILY, Disp: 270 tablet, Rfl: 0    Probiotic Product (Align) capsule, Take 1 capsule by mouth Daily., Disp: , Rfl:     riFAXIMin (Xifaxan) 550 MG tablet, Take 1 tablet by mouth 3 (Three) Times a Day for 14 days., Disp: 42 tablet, Rfl: 2  Result Review :      Common labs          11/15/2024    09:56  "  Common Labs   Glucose 85    BUN 11    Creatinine 0.76    Sodium 138    Potassium 4.3    Chloride 105    Calcium 9.0    Albumin 3.8    Total Bilirubin 0.3    Alkaline Phosphatase 83    AST (SGOT) 18    ALT (SGPT) 12    WBC 6.19    Hemoglobin 11.8    Hematocrit 37.2    Platelets 346    Colonoscopy (12/19/2024 08:14)   Office Visit with Cheryl Luis APRN (11/15/2024)   FECAL CALPROTECTIN (03/12/2024 16:23) - 27   FECAL CALPROTECTIN (03/18/2022 16:33)  6490   Tissue Pathology Exam (06/21/2021 07:00)   Transverse colon sampling:  Focal active colitis with mild melanosis coli  Rectal biopsy  Path: Acute colitis cryptitis, focal crypt abscess with retained architecture  Vital Signs:   /76 (BP Location: Left arm, Patient Position: Sitting, Cuff Size: Adult)   Ht 160 cm (62.99\")   Wt 55.9 kg (123 lb 3.2 oz)   BMI 21.83 kg/m²     Body mass index is 21.83 kg/m².      Physical Exam  Constitutional:       General: She is not in acute distress.     Appearance: Normal appearance. She is not ill-appearing.   HENT:      Head: Normocephalic.   Eyes:      General: No scleral icterus.  Pulmonary:      Effort: No respiratory distress.   Abdominal:      General: Abdomen is flat. Bowel sounds are normal. There is no distension.      Palpations: Abdomen is soft. There is no mass.      Tenderness: There is no abdominal tenderness. There is no guarding or rebound.      Hernia: No hernia is present.   Skin:     General: Skin is warm and dry.   Neurological:      Mental Status: She is alert.      Gait: Gait normal.   Psychiatric:         Mood and Affect: Mood normal.       Assessment and Plan    Diagnoses and all orders for this visit:    1. Ulcerative proctitis without complication (Primary)    2. Irritable bowel syndrome with diarrhea  -     riFAXIMin (Xifaxan) 550 MG tablet; Take 1 tablet by mouth 3 (Three) Times a Day for 14 days.  Dispense: 42 tablet; Refill: 2    3. Lower abdominal pain       Assessment & Plan  Ulcerative " proctosigmoiditis  - Colonoscopy results satisfactory, indicating effective management with Lialda.  - It is possible that recent physiologic stressors triggered by alterations in mental health therapies is contributing to unpredictable bowel movements and abdominal bloating given absence of active disease on colonoscopy assessment.  - Prescribe Xifaxan to reset gut microbiome; take three times daily for 2 weeks.  -I have encouraged her to restart dicyclomine 10 mg 3 times daily to see if this is helpful in alleviating her abdominal cramping while completing Xifaxan  - Start Metamucil to promote regularity and completeness of bowel movements, which may also help improve left-sided and lower abdominal cramping.  - Agreeable to providing intermittent time off from work for symptom exacerbation.  Could consider alternative therapy for IBD if symptoms do not improve with control of mental health and updated treatment plan.  - She we will continue current Asacol and Canasa treatment plan.    Borderline personality disorder:  - Switching medications to reduce sedation side effects.  - Considering Dialectical Behavior Therapy (DBT) despite high cost.  - Discussed potential impact of stress on gastrointestinal symptoms.    Follow-up:  - 4 weeks.  Patient is agreeable to the outlined above treatment plan.  Verbalizes understanding and will contact office for any new or worsening concerns.  All questions answered and support provided.  Patient Instructions   Continue taking:  Asacol 800 mg 1 tablet 3 times daily, Canasa 1000 mg suppository daily    For Abdominal Bloating:     We will prescribe you a course of a gut specific antibiotic called Xifaxan. You will take 1 tab 3 x daily x 14 days.   Any improvement in stool consistency, gas, and bloating is a success and is likely to improve with repeat dosing.  This medication can be completed up to 2 additional times      For abdominal cramping:     a prescription for dicyclomine  has been sent to your pharmacy.  You may take 1 tablet up to 3 times daily as needed for symptom management.    This medication does work well if taken 1 hour before meals to help prevent the fecal urgency that can occur after eating.  However can also be taken up to 3 times daily as needed for treatment of abdominal cramping  This can cause constipation and if occurs please reduce dosing      We recommend starting a daily fiber supplement such as Metamucil (capsules or powder)     As well as consuming at least 20 to 30 g of dietary fiber per day  This helps to keep stool soft and formed and easy transit throughout the colon to produce regular and complete bowel movements.  When starting fiber regimen recommend starting with a low-dose and gradually increasing by 1 tablespoon every 7 days as tolerated.  This will help your body acclimate to the higher fiber diet and reduce risk of unwanted side effects that include bloating, gas, abdominal fullness, and cramping  For example: Begin taking 1 tablespoon daily for at least 7 days, if this is not successful in producing regular bowel movements can begin taking 1 tablespoons twice a day, and finally if this is not successful after taking 2 tablespoons for 7 days, can further increase to maximum recommended dosing of 1 tablespoon 3 times per day.  If you choose capsule formulation:  Begin taking 3 capsules daily in the morning with at least 8 ounces of water, then increase by 1 capsule every 7 days until stool becomes soft and bowel movements are complete and sensation.  Do not exceed maximum package dosing  It is important to consume increased amounts of fluid throughout the day to help improve the effectiveness of the fiber supplementation  Avoid gummy formulations of fiber as this can further increase your risk of the above adverse effects due to artificial sweeteners in the Gummies.  Fiber supplements can take 12 to 72 hours to start working. Make sure to drink 6 to  12 ounces of water or noncarbonated beverage with fiber supplement.          EMR Dragon/Transcription Disclaimer:  This document has been Dictated utilizing Dragon dictation.     Cheryl Luis, MSN, APRN, FNP-C   [facility]  Gastroenterology   [address] [id number] [phone] office  [phone] fax

## 2025-04-24 NOTE — PATIENT INSTRUCTIONS
Continue taking:  Asacol 800 mg 1 tablet 3 times daily, Canasa 1000 mg suppository daily    For Abdominal Bloating:     We will prescribe you a course of a gut specific antibiotic called Xifaxan. You will take 1 tab 3 x daily x 14 days.   Any improvement in stool consistency, gas, and bloating is a success and is likely to improve with repeat dosing.  This medication can be completed up to 2 additional times      For abdominal cramping:     a prescription for dicyclomine has been sent to your pharmacy.  You may take 1 tablet up to 3 times daily as needed for symptom management.    This medication does work well if taken 1 hour before meals to help prevent the fecal urgency that can occur after eating.  However can also be taken up to 3 times daily as needed for treatment of abdominal cramping  This can cause constipation and if occurs please reduce dosing      We recommend starting a daily fiber supplement such as Metamucil (capsules or powder)     As well as consuming at least 20 to 30 g of dietary fiber per day  This helps to keep stool soft and formed and easy transit throughout the colon to produce regular and complete bowel movements.  When starting fiber regimen recommend starting with a low-dose and gradually increasing by 1 tablespoon every 7 days as tolerated.  This will help your body acclimate to the higher fiber diet and reduce risk of unwanted side effects that include bloating, gas, abdominal fullness, and cramping  For example: Begin taking 1 tablespoon daily for at least 7 days, if this is not successful in producing regular bowel movements can begin taking 1 tablespoons twice a day, and finally if this is not successful after taking 2 tablespoons for 7 days, can further increase to maximum recommended dosing of 1 tablespoon 3 times per day.  If you choose capsule formulation:  Begin taking 3 capsules daily in the morning with at least 8 ounces of water, then increase by 1 capsule every 7 days until  stool becomes soft and bowel movements are complete and sensation.  Do not exceed maximum package dosing  It is important to consume increased amounts of fluid throughout the day to help improve the effectiveness of the fiber supplementation  Avoid gummy formulations of fiber as this can further increase your risk of the above adverse effects due to artificial sweeteners in the Gummies.  Fiber supplements can take 12 to 72 hours to start working. Make sure to drink 6 to 12 ounces of water or noncarbonated beverage with fiber supplement.

## 2025-04-28 ENCOUNTER — TELEPHONE (OUTPATIENT)
Dept: GASTROENTEROLOGY | Facility: CLINIC | Age: 45
End: 2025-04-28
Payer: COMMERCIAL

## 2025-04-28 NOTE — TELEPHONE ENCOUNTER
PA Case: [id number], Status: Approved, Coverage Starts on: 4/28/2025 12:00:00 AM, Coverage Ends on: 4/28/2026 12:00:00 AM.. Authorization Expiration Date: April 28, 2026.

## 2025-05-25 ENCOUNTER — E-VISIT (OUTPATIENT)
Dept: FAMILY MEDICINE CLINIC | Facility: TELEHEALTH | Age: 45
End: 2025-05-25
Payer: COMMERCIAL

## 2025-05-25 NOTE — E-VISIT ESCALATED
Date: 2025 11:38:48  Clinician: Suze Beltran  Clinician NPI: [id number]  Patient: Jennie Curry  Patient : 1980  Patient Address: [facility] [address], [place] [id number]  Patient Phone: [phone]  Visit Protocol: Nail conditions  Patient Summary:  Jennie is a 44 year old ( : 1980 ) female who initiated a visit for evaluation of a nail condition.     Jennie uploaded images of the nail.   More than one fingernail is affected. The symptoms started 3-4 weeks ago.   Symptom   details  Jennie has discoloration on the affected area. The color of the affected area is yellow.   Jennie denies pain, warmth, and swelling on the affected area. Jennie also denies discharge, a deformed or crooked nail, open sores, and growth of new   skin tissue. Jennie has not had similar symptoms on the same nail before. Jennie also reports that the symptoms did not start as a result of an injury. Jennie does not feel feverish.   Jennie has attempted the following treatments for the area (free   text): Fungi nail antifungal liquid   Pertinent medical history  Jennie does not get a yeast infection when taking antibiotics.   Reported skin tone by Jennie is Type 3: fair to medium natural skin.   Jennie does not have diabetes. Jennie does not have a   circulation problem (such as Peripheral Vascular Disease (PVD), Deep Venous Thrombosis (DVT), or blood clots) and does not have nerve damage on the affected extremity. Jennie does not have liver disease.   Jennie has had a tetanus shot within the last   10 years. (Note: For hand injuries tetanus immunization status should be reviewed and booster immunization given accordingly).   Date of last tetanus shot as reported by the patient: 1980   Jennie denies pregnancy and denies breastfeeding.   Jennie   does not smoke or use smokeless tobacco. Jennie does not vape or use other e-cigarette products.   Weight: 125 lbs (56.7 kg)    MEDICATIONS: rifaximin oral, lamotrigine oral,  mesalamine oral, buspirone oral, dicyclomine oral, lidocaine (PF) injection, Lactobacillus acidophilus-Bifidobacterium animalis oral, mesalamine rectal, triamcinolone acetonide injection, ALLERGIES:   amoxicillin  Clinician Response:  Dear Jennie,    Your health is our priority. To determine the most appropriate care for you, I would like you to be seen in person to further discuss your health history and symptoms.  You will not be charged for this visit. Thank   you for trusting us with your care.   Diagnosis: Refer for additional evaluation  Diagnosis ICD: R69  Additional Clinician Notes:  You will need to get this medication from your PCP as you need to have labs done to check liver function before starting the medication.

## 2025-07-07 DIAGNOSIS — K51.20 ULCERATIVE PROCTITIS WITHOUT COMPLICATION: ICD-10-CM

## 2025-07-08 RX ORDER — MESALAMINE 1.2 G/1
3.6 TABLET, DELAYED RELEASE ORAL DAILY
Qty: 270 TABLET | Refills: 0 | Status: SHIPPED | OUTPATIENT
Start: 2025-07-08

## 2025-08-05 ENCOUNTER — OFFICE VISIT (OUTPATIENT)
Dept: ORTHOPEDIC SURGERY | Facility: CLINIC | Age: 45
End: 2025-08-05
Payer: COMMERCIAL

## 2025-08-05 VITALS — BODY MASS INDEX: 21.79 KG/M2 | WEIGHT: 123 LBS | HEIGHT: 63 IN

## 2025-08-05 DIAGNOSIS — M79.641 RIGHT HAND PAIN: Primary | ICD-10-CM

## 2025-08-05 PROCEDURE — 99213 OFFICE O/P EST LOW 20 MIN: CPT | Performed by: INTERNAL MEDICINE

## (undated) DEVICE — ADAPT CLN BIOGUARD AIR/H2O DISP

## (undated) DEVICE — Device

## (undated) DEVICE — SYR LL W/SCALE/MARK 3ML STRL

## (undated) DEVICE — KT ORCA ORCAPOD DISP STRL

## (undated) DEVICE — BW-412T DISP COMBO CLEANING BRUSH: Brand: SINGLE USE COMBINATION CLEANING BRUSH

## (undated) DEVICE — SOL IRR H2O BO 1000ML STRL

## (undated) DEVICE — LINER SURG CANSTR SXN S/RIGD 1500CC